# Patient Record
Sex: FEMALE | Race: BLACK OR AFRICAN AMERICAN
[De-identification: names, ages, dates, MRNs, and addresses within clinical notes are randomized per-mention and may not be internally consistent; named-entity substitution may affect disease eponyms.]

---

## 2024-06-26 ENCOUNTER — CARE COORDINATION (OUTPATIENT)
Dept: OTHER | Facility: CLINIC | Age: 59
End: 2024-06-26

## 2024-06-26 NOTE — CARE COORDINATION
Ambulatory Care Coordination Note     6/26/2024 4:52 PM     Patient outreach attempt by this ACM today to offer care management services. [facility] was unable to reach the patient by telephone today; left voice message requesting a return phone call to this ACM.     ACM: Philomena Valentino RN     Care Summary Note: Pt assigned from census report for surgery planned from 6/7/2024 r/t Solitary pulmonary nodule. Approval noted on Census. After chart review procedure never done as insurance denied. Reviewed [facility] site and denial still noted. Denial r/t lack of clinicals and later held up due to incorrect CPT codes. Chart reviewed and Oncology team notes that [facility] is having additional meeting with insurance from 6/12/2024. Will wait for update on R site to determine if this was approved. Will attempt to reach pt again next week.     PCP/Specialist follow up:       Follow Up:   Plan for next ACM outreach in approximately 1 week to complete:  - outreach attempt to offer care management services.

## 2024-06-27 ENCOUNTER — CARE COORDINATION (OUTPATIENT)
Dept: OTHER | Facility: CLINIC | Age: 59
End: 2024-06-27

## 2024-06-27 NOTE — CARE COORDINATION
Ambulatory Care Coordination Note     2024 4:04 PM     Patient Current Location:  [place]     This patient was received as a referral from Population health report .    ACM contacted the patient by telephone. Verified name and  with patient as identifiers. Provided introduction to self, and explanation of the ACM role.   Patient accepted care management services at this time.          ACM: Philomena Valentino RN     Challenges to be reviewed by the provider   Additional needs identified to be addressed with provider No  none               Method of communication with provider: none.    Care Summary Note: Able to reach pt after getting return call. Reviewed pt hx and planned surgery needs. Pt set for to address Solitary pulmonary nodule on 2024. This was denied but per [facility] census report it is now approved. Procedure to be complete at [facility] [place] [facility] and facility has been in touch with pt for updates as appeal was completed. Will chart review next week to assure UMR site reflects approval and allow time for letter to arrive to pt with update. Once surgery rescheduled this date will need to be updated with UMR. Pt has no other ACM needs at this time. Pt agreeable to ACM follow up.     Offered patient enrollment in the Remote Patient Monitoring (RPM) program for in-home monitoring: Patient is not eligible for RPM program because: insurance coverage.      Follow Up:   Plan for next ACM outreach in approximately 1 week to complete:  - review UMR approval/ Upcoming surgery reschedule?  .   patient  is agreeable to this plan.

## 2024-07-03 ENCOUNTER — CARE COORDINATION (OUTPATIENT)
Dept: OTHER | Facility: CLINIC | Age: 59
End: 2024-07-03

## 2024-07-03 NOTE — CARE COORDINATION
Ambulatory Care Coordination Note     7/3/2024 9:42 AM     Patient Current Location:  [place]     ACM contacted the patient by telephone. Verified name and  with patient as identifiers.         ACM: Philomena Valentino RN     Challenges to be reviewed by the provider   Additional needs identified to be addressed with provider No  none               Method of communication with provider: none.    Care Summary Note: Able to reach pt. Pt reports she has not been re-scheduled for surgery yet. Pt plans to reach out to [facility] today. Pt received letter in the mail from [facility] but has not read it yet. Pt plans to reach this ACM once scheduled. Date will need to be reported to [facility]. Pt agreeable to ACM follow up.     Offered patient enrollment in the Remote Patient Monitoring (RPM) program for in-home monitoring: Patient is not eligible for RPM program because: insurance coverage.       Follow Up:   Plan for next ACM outreach in approximately 2 weeks to complete:  - surgery  .   patient  is agreeable to this plan.

## 2024-07-17 ENCOUNTER — CARE COORDINATION (OUTPATIENT)
Dept: OTHER | Facility: CLINIC | Age: 59
End: 2024-07-17

## 2024-07-17 NOTE — CARE COORDINATION
Ambulatory Care Coordination Note     2024 3:07 PM     Patient Current Location:  [place]     ACM contacted the patient by telephone. Verified name and  with patient as identifiers.         ACM: Philomena Valentino RN     Challenges to be reviewed by the provider   Additional needs identified to be addressed with provider No  none               Method of communication with provider: none.    Care Summary Note: Able to reach pt. Surgery rescheduled for 2024. Pt did not read UMR letter but plans to try to locate it. Pt has no concerns prior to surgery. Will assure surgery date updated by hospital and approval still in place prior to surgery. Pt agreeable to ACM follow up. Will outreach pt after surgery.     Offered patient enrollment in the Remote Patient Monitoring (RPM) program for in-home monitoring: Patient is not eligible for RPM program because: insurance coverage.       Care Planning:    Goals Addressed                      This Visit's Progress      Patient Stated (pt-stated)         Complete surgery as planned to address pulmonary nodule and manage health needs post op     Barriers: stress  Plan for overcoming my barriers: work with ACM and specialist   Confidence: 8/10  Anticipated Goal Completion Date: 2024               PCP/Specialist follow up:       Follow Up:   Plan for next ACM outreach in approximately 2 weeks to complete:  - post-op needs .   Patient  is agreeable to this plan.

## 2024-07-24 ENCOUNTER — CARE COORDINATION (OUTPATIENT)
Dept: OTHER | Facility: CLINIC | Age: 59
End: 2024-07-24

## 2024-07-24 NOTE — CARE COORDINATION
Ambulatory Care Coordination Note     7/24/2024 9:13 AM       ACM: Philomena Valentino RN     Care Summary Note: Reviewed UMR and surgery date updated and approved. Will outreach pt after surgery.       Follow Up:   Plan for next ACM outreach in approximately 1 week to complete:  Post-op  .

## 2024-08-02 ENCOUNTER — CARE COORDINATION (OUTPATIENT)
Dept: OTHER | Facility: CLINIC | Age: 59
End: 2024-08-02

## 2024-08-02 NOTE — CARE COORDINATION
Care Transitions Note    Initial Call - Call within 2 business days of discharge: Yes    Patient Current Location:  [place]    Care Transition Nurse contacted the patient by telephone to perform post hospital discharge assessment, verified name and  as identifiers. Provided introduction to self, and explanation of the Care Transition Nurse role.     Patient: Mary Yee    Patient : 1965   MRN: [id number]    Reason for Admission: Status post lung surgery   Discharge Date:    RURS: No data recorded    Last Discharge Facility       None            Was this an external facility discharge? Yes. Discharge Date: 24. Facility Name: [facility]     Additional needs identified to be addressed with provider   No needs identified             Method of communication with provider: none.    Patients top risk factors for readmission: medical condition-post-op     Interventions to address risk factors:   Education: post-op     Care Summary Note: Able to reach pt. Discussed planned surgery, Vats with lobectomy and thoracotomy. Pt reports doing well at this time. Discussed incision care and pt reports no s/sx of infection. Pt has post op follow up 2024. Pt managing pain well at this time with PRN pain medication. Pt able to fill and taking all medications as directed. Pt reports having good family/friend support. Family/friend will provide transport to post-op follow up. Discussed post-op care and needs. Pt agreeable to [facility] follow up.     Care Transition Nurse reviewed discharge instructions and red flags with patient. The patient was given an opportunity to ask questions; all questions answered at this time.. The patient verbalized understanding.   Were discharge instructions available to patient? Yes.   Reviewed appropriate site of care based on symptoms and resources available to patient including: PCP  Specialist  Urgent care clinics  When to call [employer]  [employer]. The patient agrees to contact  Decrease erythromycin ointment to every night before bed for 3 more days  Decrease vigamox drops to 3 drops a day for 3 more days  Add artificial tears four times a day for one week

## 2024-08-03 RX ORDER — MULTIVIT-MIN/IRON/FOLIC ACID/K 18-600-40
CAPSULE ORAL
COMMUNITY

## 2024-08-03 RX ORDER — CYCLOSPORINE 0.5 MG/ML
1 EMULSION OPHTHALMIC AS NEEDED
COMMUNITY

## 2024-08-03 RX ORDER — RIVAROXABAN 10 MG/1
10 TABLET, FILM COATED ORAL EVERY 24 HOURS
COMMUNITY

## 2024-08-03 RX ORDER — MIRABEGRON 25 MG/1
25 TABLET, FILM COATED, EXTENDED RELEASE ORAL DAILY
COMMUNITY

## 2024-08-03 RX ORDER — CETIRIZINE HYDROCHLORIDE 10 MG/1
10 TABLET ORAL DAILY
COMMUNITY

## 2024-08-03 RX ORDER — MULTIVIT WITH MINERALS/LUTEIN
250 TABLET ORAL DAILY
COMMUNITY

## 2024-08-03 RX ORDER — ACETAMINOPHEN 500 MG
1000 TABLET ORAL EVERY 8 HOURS PRN
COMMUNITY

## 2024-08-03 RX ORDER — OXYCODONE HYDROCHLORIDE 5 MG/1
5 TABLET ORAL EVERY 4 HOURS PRN
COMMUNITY

## 2024-08-03 RX ORDER — ROSUVASTATIN CALCIUM 10 MG/1
10 TABLET, COATED ORAL DAILY
COMMUNITY

## 2024-08-03 RX ORDER — METHOCARBAMOL 500 MG/1
500 TABLET, FILM COATED ORAL EVERY 6 HOURS PRN
COMMUNITY

## 2024-08-03 RX ORDER — M-VIT,TX,IRON,MINS/CALC/FOLIC 27MG-0.4MG
1 TABLET ORAL DAILY
COMMUNITY

## 2024-08-03 RX ORDER — POLYETHYLENE GLYCOL 3350 17 G/17G
17 POWDER, FOR SOLUTION ORAL DAILY
COMMUNITY

## 2024-08-03 RX ORDER — GABAPENTIN 100 MG/1
100 CAPSULE ORAL 3 TIMES DAILY
COMMUNITY

## 2024-08-03 RX ORDER — OMEPRAZOLE 40 MG/1
40 CAPSULE, DELAYED RELEASE ORAL DAILY
COMMUNITY

## 2024-08-09 ENCOUNTER — CARE COORDINATION (OUTPATIENT)
Dept: OTHER | Facility: CLINIC | Age: 59
End: 2024-08-09

## 2024-08-09 NOTE — CARE COORDINATION
Care Transitions Note    Follow Up Call     Patient Current Location:  [place]    Care Transition Nurse contacted the patient by telephone. Verified name and  as identifiers.    Additional needs identified to be addressed with provider   No needs identified                 Method of communication with provider: none.    Care Summary Note: Able to reach pt. Pt reports doing well. Pt incision with no s/sx of infection. Pt reports walking for exercise. Pt taking medications as ordered and doing deep breathing. Pt reports no pain but some spasms as expected. Discussed post-op needs and educations. Pt has post-op follow up as listed below. Pt agreeable to AC follow up. Will follow up after post-op follow up. Pt managing well at this time.     Plan of care updates since last contact:  Education: post-op education        Advance Care Planning:   Does patient have an Advance Directive: deferred at this time, will discuss on future follow up. .    Medication Review:  No changes since last call.     Remote Patient Monitoring:  Offered patient enrollment in the Remote Patient Monitoring (RPM) program for in-home monitoring: Patient is not eligible for RPM program because: insurance coverage.    Assessments:  Care Transitions Subsequent and Final Call    Schedule Follow Up Appointment with PCP: Completed  Subsequent and Final Calls  Do you have any ongoing symptoms?: No  Have your medications changed?: No  Do you have any questions related to your medications?: No  Do you currently have any active services?: No  Do you have any needs or concerns that I can assist you with?: No  Identified Barriers: Stress, Time Constraints  Care Transitions Interventions     Other Services: Completed (Comment: discharge education)   Other Interventions:             Goals Addressed                      This Visit's Progress      Patient Stated (pt-stated)         Complete surgery as planned to address pulmonary nodule and manage health

## 2024-09-04 ENCOUNTER — CARE COORDINATION (OUTPATIENT)
Dept: OTHER | Facility: CLINIC | Age: 59
End: 2024-09-04

## 2024-09-04 NOTE — CARE COORDINATION
Patient has agreed to contact primary care provider and/or specialist for any further questions, concerns, or needs.    Philomena Valentino RN